# Patient Record
Sex: FEMALE | Race: WHITE | NOT HISPANIC OR LATINO
[De-identification: names, ages, dates, MRNs, and addresses within clinical notes are randomized per-mention and may not be internally consistent; named-entity substitution may affect disease eponyms.]

---

## 2020-11-25 ENCOUNTER — NON-APPOINTMENT (OUTPATIENT)
Age: 85
End: 2020-11-25

## 2020-11-25 DIAGNOSIS — Z87.891 PERSONAL HISTORY OF NICOTINE DEPENDENCE: ICD-10-CM

## 2020-11-25 DIAGNOSIS — I10 ESSENTIAL (PRIMARY) HYPERTENSION: ICD-10-CM

## 2020-11-25 DIAGNOSIS — Z80.8 FAMILY HISTORY OF MALIGNANT NEOPLASM OF OTHER ORGANS OR SYSTEMS: ICD-10-CM

## 2020-11-25 PROBLEM — Z00.00 ENCOUNTER FOR PREVENTIVE HEALTH EXAMINATION: Status: ACTIVE | Noted: 2020-11-25

## 2020-11-25 RX ORDER — CHROMIUM 200 MCG
TABLET ORAL
Refills: 0 | Status: ACTIVE | COMMUNITY

## 2020-11-25 RX ORDER — VIT C/E/ZN/COPPR/LUTEIN/ZEAXAN 250MG-90MG
CAPSULE ORAL
Refills: 0 | Status: ACTIVE | COMMUNITY

## 2020-11-25 RX ORDER — LOSARTAN POTASSIUM 25 MG/1
25 TABLET, FILM COATED ORAL DAILY
Refills: 0 | Status: ACTIVE | COMMUNITY

## 2021-02-16 ENCOUNTER — APPOINTMENT (OUTPATIENT)
Dept: PULMONOLOGY | Facility: CLINIC | Age: 86
End: 2021-02-16

## 2021-02-23 ENCOUNTER — APPOINTMENT (OUTPATIENT)
Dept: PULMONOLOGY | Facility: CLINIC | Age: 86
End: 2021-02-23
Payer: MEDICARE

## 2021-02-23 VITALS
OXYGEN SATURATION: 98 % | DIASTOLIC BLOOD PRESSURE: 68 MMHG | HEART RATE: 55 BPM | SYSTOLIC BLOOD PRESSURE: 118 MMHG | TEMPERATURE: 96.8 F

## 2021-02-23 PROCEDURE — 99214 OFFICE O/P EST MOD 30 MIN: CPT

## 2021-02-23 NOTE — HISTORY OF PRESENT ILLNESS
[TextBox_4] : the patient is 86 year F with SOB and has COPD and ILd She is doing well and lives a sedentary life style  She is on prednisone QOD and breo qd.  She has not had an interval event

## 2021-02-23 NOTE — ASSESSMENT
[FreeTextEntry1] : 1) stable pulmonary function  she is on Breo,. prednisone 10mg QOD and ipratropium nebs   3) continue medications

## 2021-02-23 NOTE — REASON FOR VISIT
[Follow-Up] : a follow-up visit [TextBox_44] : PT is a 85 y/o Female. [facility]PA Pulm Evaluation. PT states that she has a dry cough, difficulty swallowing from the 'drip of the oxygen from the nasal region'. PT experiences fatigue easily. PT denies any other complications.

## 2021-02-23 NOTE — PHYSICAL EXAM
[No Acute Distress] : no acute distress [Normal Appearance] : normal appearance [Normal Rate/Rhythm] : normal rate/rhythm [Normal S1, S2] : normal s1, s2 [No Resp Distress] : no resp distress [Clear to Auscultation Bilaterally] : clear to auscultation bilaterally [No Abnormalities] : no abnormalities [Benign] : benign [Normal Gait] : normal gait [No Clubbing] : no clubbing [No Cyanosis] : no cyanosis [No Edema] : no edema [FROM] : FROM [Normal Color/ Pigmentation] : normal color/ pigmentation [No Focal Deficits] : no focal deficits [Oriented x3] : oriented x3 [TextBox_2] : obese

## 2021-05-04 ENCOUNTER — APPOINTMENT (OUTPATIENT)
Dept: PULMONOLOGY | Facility: CLINIC | Age: 86
End: 2021-05-04
Payer: MEDICARE

## 2021-05-04 VITALS
TEMPERATURE: 97.2 F | OXYGEN SATURATION: 96 % | HEART RATE: 58 BPM | SYSTOLIC BLOOD PRESSURE: 146 MMHG | DIASTOLIC BLOOD PRESSURE: 72 MMHG

## 2021-05-04 PROCEDURE — 99213 OFFICE O/P EST LOW 20 MIN: CPT

## 2021-05-04 NOTE — REVIEW OF SYSTEMS
[Dyspnea] : dyspnea [SOB on Exertion] : sob on exertion [Arthralgias] : arthralgias [Myalgias] : myalgias [Back Pain] : back pain [Chronic Pain] : chronic pain [Negative] : Hematologic

## 2021-05-04 NOTE — ASSESSMENT
[FreeTextEntry1] : The patient has a long hx of smoking and has interstitial disease  She does not have any new sx  She has had a walk test for COVID and her SaO2 dropped  87% off O2 and returned to > 90% on 2l-3L  of O2

## 2021-05-04 NOTE — HISTORY OF PRESENT ILLNESS
[TextBox_4] : the patient is 86 year F with SOB and has COPD and ILd She is doing well and lives a sedentary life style  She is on prednisone QOD and breo qd.  She has not had an interval event   \par \par 5/4/21  The patient 86 yrs  she has had COVID vaccinations, that the volunteer firemen came to her house to give her    She offers no new complaint and seems her usual jovial state

## 2021-09-14 ENCOUNTER — APPOINTMENT (OUTPATIENT)
Dept: PULMONOLOGY | Facility: CLINIC | Age: 86
End: 2021-09-14
Payer: MEDICARE

## 2021-09-14 VITALS — HEART RATE: 58 BPM | SYSTOLIC BLOOD PRESSURE: 159 MMHG | DIASTOLIC BLOOD PRESSURE: 76 MMHG | OXYGEN SATURATION: 96 %

## 2021-09-14 PROCEDURE — 99214 OFFICE O/P EST MOD 30 MIN: CPT

## 2021-09-14 RX ORDER — ALBUTEROL SULFATE 90 UG/1
108 (90 BASE) INHALANT RESPIRATORY (INHALATION)
Qty: 2 | Refills: 3 | Status: ACTIVE | COMMUNITY

## 2021-09-14 NOTE — REASON FOR VISIT
[Follow-Up] : a follow-up visit [COPD] : COPD [TextBox_44] : Pt states that she rarely experiences SOB and wheeze.  Pt does have to clear her throat often due to oxygen use and GERD.

## 2021-09-14 NOTE — PHYSICAL EXAM
[No Acute Distress] : no acute distress [Well Nourished] : well nourished [Well Groomed] : well groomed [Normal Rate/Rhythm] : normal rate/rhythm [Normal S1, S2] : normal s1, s2 [No Resp Distress] : no resp distress [Clear to Auscultation Bilaterally] : clear to auscultation bilaterally [Benign] : benign [No Clubbing] : no clubbing [No Cyanosis] : no cyanosis [Normal Color/ Pigmentation] : normal color/ pigmentation [No Focal Deficits] : no focal deficits [Oriented x3] : oriented x3 [TextBox_2] : appears to have lost some weight   [TextBox_54] : systolic murmur [TextBox_68] : clear lung fields

## 2021-09-14 NOTE — ASSESSMENT
[FreeTextEntry1] : The patient has a long hx of smoking and has interstitial disease  She does not have any new sx  She has had a walk test for COVID and her SaO2 dropped  87% off O2 and returned to > 90% on 2l-3L  of O2   \par \par 9/14/21  I will try to arrange a new O2 concentrator for the patient   she is otherwise stable  no distress  scripts refilled

## 2021-09-14 NOTE — HISTORY OF PRESENT ILLNESS
[Former] : former [Never] : never [Continuous] : Continuous [NC] : Nasal Cannula [24 hrs] : 24 hours/day [TextBox_4] : 9/14/21 the patient is doing well she would like to try and use a Inogen O2 concentrator but is concerned it may not work for her. [TextBox_11] : 1 [TextBox_13] : 55 [YearQuit] : 2005 [FreeTextEntry1] : 2

## 2022-03-15 ENCOUNTER — APPOINTMENT (OUTPATIENT)
Dept: PULMONOLOGY | Facility: CLINIC | Age: 87
End: 2022-03-15
Payer: MEDICARE

## 2022-03-15 VITALS
OXYGEN SATURATION: 91 % | TEMPERATURE: 96.8 F | HEART RATE: 63 BPM | SYSTOLIC BLOOD PRESSURE: 109 MMHG | WEIGHT: 160 LBS | DIASTOLIC BLOOD PRESSURE: 60 MMHG | HEIGHT: 61.75 IN | BODY MASS INDEX: 29.44 KG/M2

## 2022-03-15 DIAGNOSIS — K21.9 GASTRO-ESOPHAGEAL REFLUX DISEASE W/OUT ESOPHAGITIS: ICD-10-CM

## 2022-03-15 PROCEDURE — 99214 OFFICE O/P EST MOD 30 MIN: CPT

## 2022-03-15 NOTE — REASON FOR VISIT
[Follow-Up] : a follow-up visit [Cough] : cough [COPD] : COPD [TextBox_44] : 6 months. pt states that her acid reflux and post nasal drip has worsened causing her to cough. Pt is O2 dependent.

## 2022-03-15 NOTE — ASSESSMENT
[FreeTextEntry1] : The patient has a long hx of smoking and has interstitial disease  She does not have any new sx  She has had a walk test for COVID and her SaO2 dropped  87% off O2 and returned to > 90% on 2l-3L  of O2   \par \par 9/14/21  I will try to arrange a new O2 concentrator for the patient   she is otherwise stable  no distress  scripts refilled\par \par 3/15/22 The patient has no new sx She has GERD that she treats with 'Pypya Enzymes" holistic medication that seems to be controlling for the patient    ILD slow progression probably related to her arthritis   continue supportive RX    30minutes F/u 6mos

## 2022-03-15 NOTE — REVIEW OF SYSTEMS
[Dyspnea] : dyspnea [SOB on Exertion] : sob on exertion [GERD] : gerd [Arthralgias] : arthralgias [Back Pain] : back pain [Myalgias] : myalgias [Chronic Pain] : chronic pain [Negative] : Hematologic

## 2022-03-15 NOTE — HISTORY OF PRESENT ILLNESS
[Former] : former [Continuous] : Continuous [NC] : Nasal Cannula [24 hrs] : 24 hours/day [Never] : never [TextBox_4] : 9/14/21 the patient is doing well she would like to try and use a Inogen O2 concentrator but is concerned it may not work for her.\par \par 3/15/22 The patient is doing well and had no new pulmonary complaint She has pulmonary fibrosis  but has been stable  She is vaccinated  She has been isolated at home b/o the COVID  She has no interval medical events  She has arthritis which is probably related to the fibrosis  She has acid reflux    [TextBox_11] : 1 [TextBox_13] : 55 [YearQuit] : 2005 [FreeTextEntry1] : 2

## 2022-06-01 ENCOUNTER — NON-APPOINTMENT (OUTPATIENT)
Age: 87
End: 2022-06-01

## 2022-06-08 ENCOUNTER — APPOINTMENT (OUTPATIENT)
Dept: PULMONOLOGY | Facility: CLINIC | Age: 87
End: 2022-06-08
Payer: MEDICARE

## 2022-06-08 PROCEDURE — 99441: CPT | Mod: 95

## 2022-09-13 ENCOUNTER — APPOINTMENT (OUTPATIENT)
Dept: PULMONOLOGY | Facility: CLINIC | Age: 87
End: 2022-09-13

## 2022-09-27 ENCOUNTER — APPOINTMENT (OUTPATIENT)
Dept: PULMONOLOGY | Facility: CLINIC | Age: 87
End: 2022-09-27

## 2022-10-25 ENCOUNTER — APPOINTMENT (OUTPATIENT)
Dept: PULMONOLOGY | Facility: CLINIC | Age: 87
End: 2022-10-25

## 2022-10-25 VITALS
OXYGEN SATURATION: 99 % | WEIGHT: 132.8 LBS | DIASTOLIC BLOOD PRESSURE: 58 MMHG | BODY MASS INDEX: 25.07 KG/M2 | HEIGHT: 61 IN | HEART RATE: 69 BPM | SYSTOLIC BLOOD PRESSURE: 120 MMHG | TEMPERATURE: 96.5 F

## 2022-10-25 DIAGNOSIS — Z23 ENCOUNTER FOR IMMUNIZATION: ICD-10-CM

## 2022-10-25 PROCEDURE — 99213 OFFICE O/P EST LOW 20 MIN: CPT

## 2022-10-25 RX ORDER — BRIMONIDINE TARTRATE 1 MG/ML
0.1 SOLUTION/ DROPS OPHTHALMIC
Refills: 0 | Status: ACTIVE | COMMUNITY
Start: 2022-10-25

## 2022-10-25 RX ORDER — FLUTICASONE FUROATE AND VILANTEROL TRIFENATATE 200; 25 UG/1; UG/1
200-25 POWDER RESPIRATORY (INHALATION)
Qty: 3 | Refills: 3 | Status: COMPLETED | COMMUNITY
End: 2022-10-25

## 2022-10-25 RX ORDER — IPRATROPIUM BROMIDE AND ALBUTEROL SULFATE 2.5; .5 MG/3ML; MG/3ML
0.5-2.5 (3) SOLUTION RESPIRATORY (INHALATION)
Qty: 1080 | Refills: 3 | Status: COMPLETED | COMMUNITY
End: 2022-10-25

## 2022-10-25 NOTE — HISTORY OF PRESENT ILLNESS
[Former] : former [Never] : never [Continuous] : Continuous [NC] : Nasal Cannula [24 hrs] : 24 hours/day [TextBox_4] : 9/14/21 the patient is doing well she would like to try and use a Inogen O2 concentrator but is concerned it may not work for her.\par \par 3/15/22 The patient is doing well and had no new pulmonary complaint She has pulmonary fibrosis  but has been stable  She is vaccinated  She has been isolated at home b/o the COVID  She has no interval medical events  She has arthritis which is probably related to the fibrosis  She has acid reflux   \par \par 10/25/22 The patient is doing well and has lost 30lbs.  The patient has lost some of her appetite and also has a problem with her tongue   She had a family reunion with her elederly brothers( 3) and other relatives in [place] this SEPT.   She has no new resp sx      She has stopped the BREO and does not use the nebulizer  She does take the prednisone every other day  [TextBox_11] : 1 [TextBox_13] : 55 [YearQuit] : 2005 [FreeTextEntry1] : 2

## 2022-10-25 NOTE — REASON FOR VISIT
[Follow-Up] : a follow-up visit [COPD] : COPD [Shortness of Breath] : shortness of breath [TextBox_44] : 6 month follow up.

## 2022-10-25 NOTE — REVIEW OF SYSTEMS
[Dyspnea] : dyspnea [SOB on Exertion] : sob on exertion [GERD] : gerd [Arthralgias] : arthralgias [Myalgias] : myalgias [Back Pain] : back pain [Chronic Pain] : chronic pain [Negative] : Hematologic [TextBox_3] : lost 30lbs

## 2022-10-25 NOTE — ASSESSMENT
[FreeTextEntry1] : The patient has a long hx of smoking and has interstitial disease  She does not have any new sx  She has had a walk test for COVID and her SaO2 dropped  87% off O2 and returned to > 90% on 2l-3L  of O2   \par \par 9/14/21  I will try to arrange a new O2 concentrator for the patient   she is otherwise stable  no distress  scripts refilled\par \par 3/15/22 The patient has no new sx She has GERD that she treats with 'Pypya Enzymes" holistic medication that seems to be controlling for the patient    ILD slow progression probably related to her arthritis   continue supportive RX    30minutes F/u 6mos   \par \par 10/25/22 The patient is doing well and the weight loss has made hr mobility better but is still limited by musculoskeletal limitations She will have an walk test for O2 desaturation Her Sx are less COPD and more ILd desaturation is worse and she neds  3-4L for SaO2 > 88%  f/u 6mos  time spent 20 min counseling, education, documentation,  medication reviewed,  old records reviewed, HX and PE \par \par \par \par \par

## 2023-03-13 ENCOUNTER — NON-APPOINTMENT (OUTPATIENT)
Age: 88
End: 2023-03-13

## 2023-03-21 ENCOUNTER — APPOINTMENT (OUTPATIENT)
Dept: PULMONOLOGY | Facility: CLINIC | Age: 88
End: 2023-03-21
Payer: MEDICARE

## 2023-03-21 VITALS
DIASTOLIC BLOOD PRESSURE: 62 MMHG | OXYGEN SATURATION: 99 % | HEART RATE: 57 BPM | HEIGHT: 61 IN | BODY MASS INDEX: 23.98 KG/M2 | WEIGHT: 127 LBS | TEMPERATURE: 97.4 F | SYSTOLIC BLOOD PRESSURE: 140 MMHG

## 2023-03-21 PROCEDURE — 99214 OFFICE O/P EST MOD 30 MIN: CPT

## 2023-03-21 RX ORDER — PNV NO.95/FERROUS FUM/FOLIC AC 28MG-0.8MG
TABLET ORAL
Refills: 0 | Status: ACTIVE | COMMUNITY

## 2023-04-04 NOTE — REASON FOR VISIT
[Follow-Up] : a follow-up visit [COPD] : COPD [Shortness of Breath] : shortness of breath [Wheezing] : wheezing [TextBox_44] : 5 months. Pt states she has SOB sometimes with activity and sometimes wheezing. Pt also states she has post nasal drip. Patient states when she was in hospital they turned her LPM up to 3 and she felt better. She would like to know when she will be tested to see if she needs more oxygen.

## 2023-04-04 NOTE — HISTORY OF PRESENT ILLNESS
[Former] : former [Continuous] : Continuous [NC] : Nasal Cannula [24 hrs] : 24 hours/day [TextBox_4] : 3/21/23  The patient is s/p hip surgery THR  and had post op COVID She fels she acquired it in the pre op waiting room She also had a 35 lbs weight loss recovering from COvID    She has generally has more flexibility and generally weak.     [TextBox_11] : 1 [TextBox_13] : 55 [YearQuit] : 2005 [FreeTextEntry1] : 2

## 2023-04-04 NOTE — ASSESSMENT
[FreeTextEntry1] : The patient has a long hx of smoking and has interstitial disease  She does not have any new sx  She has had a walk test for COVID and her SaO2 dropped  87% off O2 and returned to > 90% on 2l-3L  of O2   \par \par 9/14/21  I will try to arrange a new O2 concentrator for the patient   she is otherwise stable  no distress  scripts refilled\par \par 3/15/22 The patient has no new sx She has GERD that she treats with 'Pypya Enzymes" holistic medication that seems to be controlling for the patient    ILD slow progression probably related to her arthritis   continue supportive RX    30minutes F/u 6mos   \par \par 10/25/22 The patient is doing well and the weight loss has made hr mobility better but is still limited by musculoskeletal limitations She will have an walk test for O2 desaturation Her Sx are less COPD and more ILd desaturation is worse and she neds  3-4L for SaO2 > 88%  f/u 6mos  time spent 20 min counseling, education, documentation,  medication reviewed,  old records reviewed, HX and PE \par \par 3/21/23  The patient is doing well She is a former smoker and has interstitial lung disease  \par She has remarkably rebounded from the COVID and THR     She is controlled and  and has not  had any new resp events   She needs touse the O2 nocturnally and daytime when dyspneic and O2 sat < 88% home nurse will be notified  [phone]   time spent 30mn  counseling, education, documentation, imaging reviewed, medication reviewed,  old records reviewed, HX and PE \par \par \par \par

## 2023-05-05 ENCOUNTER — NON-APPOINTMENT (OUTPATIENT)
Age: 88
End: 2023-05-05

## 2023-06-26 ENCOUNTER — RX ONLY (RX ONLY)
Age: 88
End: 2023-06-26

## 2023-07-05 RX ORDER — FLUTICASONE FUROATE AND VILANTEROL TRIFENATATE 100; 25 UG/1; UG/1
100-25 POWDER RESPIRATORY (INHALATION)
Qty: 90 | Refills: 3 | Status: ACTIVE | COMMUNITY
Start: 2023-07-05 | End: 1900-01-01

## 2023-07-10 ENCOUNTER — APPOINTMENT (OUTPATIENT)
Dept: PULMONOLOGY | Facility: CLINIC | Age: 88
End: 2023-07-10

## 2023-07-20 ENCOUNTER — OFFICE (OUTPATIENT)
Facility: LOCATION | Age: 88
Setting detail: OPHTHALMOLOGY
End: 2023-07-20
Payer: MEDICARE

## 2023-07-20 DIAGNOSIS — H16.223: ICD-10-CM

## 2023-07-20 DIAGNOSIS — H26.40: ICD-10-CM

## 2023-07-20 DIAGNOSIS — H35.033: ICD-10-CM

## 2023-07-20 DIAGNOSIS — H40.1131: ICD-10-CM

## 2023-07-20 DIAGNOSIS — H35.3132: ICD-10-CM

## 2023-07-20 DIAGNOSIS — Z96.1: ICD-10-CM

## 2023-07-20 PROBLEM — H35.443 AGE-RELATED RETICULAR DEGENERATION OF RETINA; BOTH EYES: Status: ACTIVE | Noted: 2023-07-20

## 2023-07-20 PROCEDURE — 99213 OFFICE O/P EST LOW 20 MIN: CPT | Performed by: OPHTHALMOLOGY

## 2023-07-20 PROCEDURE — 92250 FUNDUS PHOTOGRAPHY W/I&R: CPT | Performed by: OPHTHALMOLOGY

## 2023-07-20 PROCEDURE — 92283 EXTND COLOR VISION XM: CPT | Performed by: OPHTHALMOLOGY

## 2023-07-20 PROCEDURE — 92083 EXTENDED VISUAL FIELD XM: CPT | Performed by: OPHTHALMOLOGY

## 2023-07-20 ASSESSMENT — CONFRONTATIONAL VISUAL FIELD TEST (CVF)
OS_FINDINGS: FULL
OD_FINDINGS: FULL

## 2023-07-20 ASSESSMENT — TONOMETRY
OD_IOP_MMHG: 16
OS_IOP_MMHG: 16

## 2023-07-20 ASSESSMENT — SUPERFICIAL PUNCTATE KERATITIS (SPK)
OD_SPK: T
OS_SPK: T

## 2023-07-21 ASSESSMENT — REFRACTION_CURRENTRX
OD_OVR_VA: 20/
OS_OVR_VA: 20/
OS_CYLINDER: +2.25
OS_SPHERE: -0.25
OD_SPHERE: -1.25
OD_ADD: +2.50
OD_AXIS: 3
OD_CYLINDER: +1.50
OS_AXIS: 171
OS_ADD: +2.50

## 2023-07-21 ASSESSMENT — VISUAL ACUITY
OD_BCVA: 20/30
OS_BCVA: 20/40

## 2023-10-17 ENCOUNTER — APPOINTMENT (OUTPATIENT)
Dept: PULMONOLOGY | Facility: CLINIC | Age: 88
End: 2023-10-17
Payer: MEDICARE

## 2023-10-17 VITALS
OXYGEN SATURATION: 96 % | HEART RATE: 65 BPM | SYSTOLIC BLOOD PRESSURE: 112 MMHG | BODY MASS INDEX: 23.22 KG/M2 | TEMPERATURE: 97.6 F | DIASTOLIC BLOOD PRESSURE: 68 MMHG | WEIGHT: 123 LBS | HEIGHT: 61 IN

## 2023-10-17 DIAGNOSIS — Z86.79 PERSONAL HISTORY OF OTHER DISEASES OF THE CIRCULATORY SYSTEM: ICD-10-CM

## 2023-10-17 DIAGNOSIS — M19.90 UNSPECIFIED OSTEOARTHRITIS, UNSPECIFIED SITE: ICD-10-CM

## 2023-10-17 PROCEDURE — 99214 OFFICE O/P EST MOD 30 MIN: CPT

## 2023-10-17 RX ORDER — ATENOLOL 50 MG/1
50 TABLET ORAL
Refills: 0 | Status: DISCONTINUED | COMMUNITY
End: 2023-10-17

## 2023-10-17 RX ORDER — BRIMONIDINE TARTRATE 1 MG/ML
0.1 SOLUTION/ DROPS OPHTHALMIC
Qty: 10 | Refills: 0 | Status: DISCONTINUED | COMMUNITY
Start: 2022-10-21 | End: 2023-10-17

## 2023-10-17 RX ORDER — FLUTICASONE PROPIONATE 50 UG/1
50 SPRAY, METERED NASAL
Refills: 0 | Status: DISCONTINUED | COMMUNITY
End: 2023-10-17

## 2023-10-17 RX ORDER — ALBUTEROL SULFATE 2.5 MG/.5ML
SOLUTION RESPIRATORY (INHALATION)
Refills: 0 | Status: DISCONTINUED | COMMUNITY

## 2023-11-13 ENCOUNTER — APPOINTMENT (OUTPATIENT)
Dept: PULMONOLOGY | Facility: CLINIC | Age: 88
End: 2023-11-13
Payer: MEDICARE

## 2023-11-13 PROCEDURE — 94727 GAS DIL/WSHOT DETER LNG VOL: CPT

## 2023-11-13 PROCEDURE — 94729 DIFFUSING CAPACITY: CPT

## 2023-11-13 PROCEDURE — 94010 BREATHING CAPACITY TEST: CPT

## 2024-01-03 ENCOUNTER — OFFICE (OUTPATIENT)
Facility: LOCATION | Age: 89
Setting detail: OPHTHALMOLOGY
End: 2024-01-03
Payer: MEDICARE

## 2024-01-03 DIAGNOSIS — Z96.1: ICD-10-CM

## 2024-01-03 DIAGNOSIS — H35.3132: ICD-10-CM

## 2024-01-03 DIAGNOSIS — H40.1131: ICD-10-CM

## 2024-01-03 DIAGNOSIS — H16.223: ICD-10-CM

## 2024-01-03 DIAGNOSIS — H35.033: ICD-10-CM

## 2024-01-03 DIAGNOSIS — H26.40: ICD-10-CM

## 2024-01-03 PROCEDURE — 92014 COMPRE OPH EXAM EST PT 1/>: CPT | Performed by: OPHTHALMOLOGY

## 2024-01-03 PROCEDURE — 92133 CPTRZD OPH DX IMG PST SGM ON: CPT | Performed by: OPHTHALMOLOGY

## 2024-01-03 ASSESSMENT — SUPERFICIAL PUNCTATE KERATITIS (SPK)
OD_SPK: T
OS_SPK: T

## 2024-01-03 ASSESSMENT — CONFRONTATIONAL VISUAL FIELD TEST (CVF)
OD_FINDINGS: FULL
OS_FINDINGS: FULL

## 2024-01-04 ASSESSMENT — REFRACTION_CURRENTRX
OD_OVR_VA: 20/
OS_ADD: +2.50
OS_SPHERE: -0.50
OD_SPHERE: -1.50
OD_AXIS: 010
OS_AXIS: 176
OD_CYLINDER: +1.50
OS_OVR_VA: 20/
OD_ADD: +2.50
OS_CYLINDER: +2.50

## 2024-01-04 ASSESSMENT — REFRACTION_AUTOREFRACTION
OD_CYLINDER: +2.75
OS_CYLINDER: +4.50
OD_SPHERE: -1.75
OS_SPHERE: -1.50
OD_AXIS: 010
OS_AXIS: 172

## 2024-01-04 ASSESSMENT — SPHEQUIV_DERIVED
OS_SPHEQUIV: 0.75
OD_SPHEQUIV: -0.375

## 2024-02-14 ENCOUNTER — APPOINTMENT (OUTPATIENT)
Dept: PULMONOLOGY | Facility: CLINIC | Age: 89
End: 2024-02-14

## 2024-04-29 RX ORDER — PREDNISONE 10 MG/1
10 TABLET ORAL
Qty: 45 | Refills: 3 | Status: ACTIVE | COMMUNITY
Start: 1900-01-01 | End: 1900-01-01

## 2024-05-06 ENCOUNTER — OFFICE (OUTPATIENT)
Facility: LOCATION | Age: 89
Setting detail: OPHTHALMOLOGY
End: 2024-05-06
Payer: MEDICARE

## 2024-05-06 DIAGNOSIS — H35.033: ICD-10-CM

## 2024-05-06 DIAGNOSIS — H40.1131: ICD-10-CM

## 2024-05-06 DIAGNOSIS — H16.223: ICD-10-CM

## 2024-05-06 DIAGNOSIS — H35.3132: ICD-10-CM

## 2024-05-06 DIAGNOSIS — H26.40: ICD-10-CM

## 2024-05-06 DIAGNOSIS — Z96.1: ICD-10-CM

## 2024-05-06 PROCEDURE — 99213 OFFICE O/P EST LOW 20 MIN: CPT | Performed by: OPHTHALMOLOGY

## 2024-05-06 PROCEDURE — 92134 CPTRZ OPH DX IMG PST SGM RTA: CPT | Performed by: OPHTHALMOLOGY

## 2024-05-06 ASSESSMENT — CONFRONTATIONAL VISUAL FIELD TEST (CVF)
OD_FINDINGS: FULL
OS_FINDINGS: FULL

## 2024-05-08 ENCOUNTER — APPOINTMENT (OUTPATIENT)
Dept: PULMONOLOGY | Facility: CLINIC | Age: 89
End: 2024-05-08
Payer: MEDICARE

## 2024-05-08 VITALS
TEMPERATURE: 98.6 F | HEIGHT: 61 IN | OXYGEN SATURATION: 96 % | SYSTOLIC BLOOD PRESSURE: 114 MMHG | DIASTOLIC BLOOD PRESSURE: 70 MMHG | WEIGHT: 123 LBS | HEART RATE: 71 BPM | BODY MASS INDEX: 23.22 KG/M2

## 2024-05-08 DIAGNOSIS — J44.9 CHRONIC OBSTRUCTIVE PULMONARY DISEASE, UNSPECIFIED: ICD-10-CM

## 2024-05-08 DIAGNOSIS — J84.111 IDIOPATHIC INTERSTITIAL PNEUMONIA, NOT OTHERWISE SPECIFIED: ICD-10-CM

## 2024-05-08 PROCEDURE — 99214 OFFICE O/P EST MOD 30 MIN: CPT

## 2024-05-08 NOTE — PHYSICAL EXAM
[No Acute Distress] : no acute distress [Well Nourished] : well nourished [Well Groomed] : well groomed [Normal Rate/Rhythm] : normal rate/rhythm [Normal S1, S2] : normal s1, s2 [No Resp Distress] : no resp distress [Clear to Auscultation Bilaterally] : clear to auscultation bilaterally [Benign] : benign [No Clubbing] : no clubbing [No Cyanosis] : no cyanosis [Normal Color/ Pigmentation] : normal color/ pigmentation [No Focal Deficits] : no focal deficits [Oriented x3] : oriented x3 [TextBox_2] : appears to have lost some weight she is lost 30 pounds and thinks she might of gained 5 back [TextBox_54] : systolic murmur [TextBox_68] : clear lung fields  decreased BS

## 2024-05-08 NOTE — HISTORY OF PRESENT ILLNESS
[Former] : former [Never] : never [TextBox_4] :     10/17/23 The patient is doing well She was in the hospital in may for a bradycardia at [facility] she does not report any new pulmonary events. She has lost weight. She has to maciej back to f/u with the cardiologists the patient has no pulmonary complaints at this time. The patient walks with a walker and is short of breath with exertion. Patient has COPD and has a 55-pack-year history of smoking.   Smoking Status: former   # Packs per day: 1   # Years: 55   Year quit: 2005 5/8/24  She was last in the hospital a year ago  She has been doing well without O2  The has not been using her O2 and has been monitoring her SaO2 with an Oximeter and does not exert herself if she gete SoB and then her O2 does not drop below 89%  She has some discomfort at the bottom of her anterior left chest  and increases with movement     She has no cough or sputum     [TextBox_11] : 1 [TextBox_13] : 55 [YearQuit] : 2005

## 2024-05-08 NOTE — ASSESSMENT
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) The patient has a long hx of smoking and has interstitial disease She does not have any new sx She has had a walk test for COVID and her SaO2 dropped 87% off O2 and returned to > 90% on 2l-3L of O2  9/14/21 I will try to arrange a new O2 concentrator for the patient she is otherwise stable no distress scripts refilled  3/15/22 The patient has no new sx She has GERD that she treats with 'Pypya Enzymes" holistic medication that seems to be controlling for the patient ILD slow progression probably related to her arthritis continue supportive RX 30minutes F/u 6mos  0/25/22 The patient is doing well and the weight loss has made hr mobility better but is still limited by musculoskeletal limitations She will have an walk test for O2 desaturation Her Sx are less COPD and more ILd desaturation is worse and she neds 3-4L for SaO2 > 88% f/u 6mos time spent 20 min counseling, education, documentation, medication reviewed, old records reviewed, HX and PE  3/21/23 The patient is doing well She is a former smoker and has interstitial lung disease  She has remarkably rebounded from the COVID and THR She is controlled and and has not had any new resp events She needs touse the O2 nocturnally and daytime when dyspneic and O2 sat < 88% home nurse will be notified [phone] time spent 30mn counseling, education, documentation, imaging reviewed, medication reviewed, old records reviewed, HX and PE  10/17/23  COPD   55pk yrs of smoking    on prednisone 10mg  QOD and BREO    continue the same   pulmonary rehab the patient has been monitoring her SaO2 and finds in most activities that she does which require minimum of exertion have not lowered her SaO2 below 90.  The patient does not complain of wheeze cough or sputum production and her chest is clear on auscultation.  Patient's medications are up-to-date and refills are due next year patient will follow-up in approximately 4 months.  Time spent 30 minutes counseling, education, documentation, imaging reviewed, medication reviewed, old records reviewed, HX and PE   5/8/24  The patient is going to be 89 years old 7[birth year]11 and is doing well  that correlates to her weight loss and the Breo  will f/u in 6 months She was encouraged to use her O2 while sleeping   and monitor her use with an oximeter during the daytime time spent 30 min counseling, education, documentation, imaging reviewed, medication reviewed, inhaler demonstrated, old records reviewed, HX and PE

## 2024-05-08 NOTE — REASON FOR VISIT
[Follow-Up] : a follow-up visit [Cough] : cough [COPD] : COPD [Shortness of Breath] : shortness of breath [Wheezing] : wheezing [TextEntry] : 7 month. Patient complaint of coughing, sob and wheezing. Patient need a medication refill. Patient informed MA that they took her oxygen away.

## 2024-09-23 ENCOUNTER — APPOINTMENT (OUTPATIENT)
Dept: PULMONOLOGY | Facility: CLINIC | Age: 89
End: 2024-09-23
Payer: MEDICARE

## 2024-09-23 VITALS
SYSTOLIC BLOOD PRESSURE: 118 MMHG | WEIGHT: 122 LBS | HEIGHT: 61 IN | DIASTOLIC BLOOD PRESSURE: 64 MMHG | HEART RATE: 72 BPM | BODY MASS INDEX: 23.03 KG/M2 | OXYGEN SATURATION: 96 % | TEMPERATURE: 98.2 F

## 2024-09-23 PROCEDURE — 99214 OFFICE O/P EST MOD 30 MIN: CPT

## 2024-09-23 NOTE — HISTORY OF PRESENT ILLNESS
[Former] : former [Never] : never [TextBox_11] : 1 [TextBox_4] : 5/8/24 She was last in the hospital a year ago She has been doing well without O2 The has not been using her O2 and has been monitoring her SaO2 with an Oximeter and does not exert herself if she gete SoB and then her O2 does not drop below 89% She has some discomfort at the bottom of her anterior left chest and increases with movement She has no cough or sputum.   Smoking Status: former   # Packs per day: 1   # Years: 55    9/23/24  The patient is doing well  within her limited capacity  The patient is not having any distress She does not have to use O2   [TextBox_13] : 55 [YearQuit] : 2005

## 2024-09-23 NOTE — REASON FOR VISIT
[Follow-Up] : a follow-up visit [COPD] : COPD [Shortness of Breath] : shortness of breath [TextEntry] : Pt here for 4 month follow up- stated she barely uses her inhalers anymore because she feels like she doesn't need it. Occasionally gets SOB and feels like she needs to take a deep breath in to get that air back. Occasionally gets wheezing (states it has to do with her post nasal drip, and when she's tired) No other breathing complaints currently. -NK.

## 2024-10-21 ENCOUNTER — OFFICE (OUTPATIENT)
Facility: LOCATION | Age: 89
Setting detail: OPHTHALMOLOGY
End: 2024-10-21
Payer: MEDICARE

## 2024-10-21 DIAGNOSIS — H35.033: ICD-10-CM

## 2024-10-21 DIAGNOSIS — H26.40: ICD-10-CM

## 2024-10-21 DIAGNOSIS — H40.1131: ICD-10-CM

## 2024-10-21 DIAGNOSIS — H16.223: ICD-10-CM

## 2024-10-21 DIAGNOSIS — H35.3132: ICD-10-CM

## 2024-10-21 PROCEDURE — 92283 EXTND COLOR VISION XM: CPT | Performed by: OPHTHALMOLOGY

## 2024-10-21 PROCEDURE — 92250 FUNDUS PHOTOGRAPHY W/I&R: CPT | Performed by: OPHTHALMOLOGY

## 2024-10-21 PROCEDURE — 92083 EXTENDED VISUAL FIELD XM: CPT | Performed by: OPHTHALMOLOGY

## 2024-10-21 PROCEDURE — 92014 COMPRE OPH EXAM EST PT 1/>: CPT | Performed by: OPHTHALMOLOGY

## 2024-10-21 ASSESSMENT — CONFRONTATIONAL VISUAL FIELD TEST (CVF)
OD_FINDINGS: FULL
OS_FINDINGS: FULL

## 2024-10-21 ASSESSMENT — SUPERFICIAL PUNCTATE KERATITIS (SPK)
OS_SPK: T
OD_SPK: T

## 2024-10-21 ASSESSMENT — TONOMETRY
OD_IOP_MMHG: 20
OS_IOP_MMHG: 20

## 2024-10-22 ASSESSMENT — KERATOMETRY
OS_K2POWER_DIOPTERS: 46.75
OS_AXISANGLE_DEGREES: 175
OS_K1POWER_DIOPTERS: 43.25

## 2024-10-22 ASSESSMENT — REFRACTION_CURRENTRX
OS_SPHERE: -0.25
OD_AXIS: 008
OS_CYLINDER: +2.75
OD_ADD: +2.50
OD_CYLINDER: +1.25
OD_OVR_VA: 20/
OD_SPHERE: -1.25
OS_ADD: +2.50
OS_OVR_VA: 20/
OS_AXIS: 175

## 2024-10-22 ASSESSMENT — VISUAL ACUITY
OS_BCVA: 20/50-2
OD_BCVA: 20/60-2

## 2024-10-30 ENCOUNTER — OFFICE (OUTPATIENT)
Facility: LOCATION | Age: 89
Setting detail: OPHTHALMOLOGY
End: 2024-10-30
Payer: MEDICARE

## 2024-10-30 DIAGNOSIS — H26.40: ICD-10-CM

## 2024-10-30 PROCEDURE — 66821 AFTER CATARACT LASER SURGERY: CPT | Mod: RT | Performed by: OPHTHALMOLOGY

## 2024-10-30 ASSESSMENT — TONOMETRY: OD_IOP_MMHG: 17

## 2024-10-30 ASSESSMENT — SUPERFICIAL PUNCTATE KERATITIS (SPK)
OS_SPK: T
OD_SPK: T

## 2024-11-01 ENCOUNTER — RX ONLY (RX ONLY)
Age: 89
End: 2024-11-01

## 2024-11-01 ASSESSMENT — VISUAL ACUITY
OS_BCVA: 20/70
OD_BCVA: 20/60-1

## 2024-11-01 ASSESSMENT — REFRACTION_CURRENTRX
OS_AXIS: 175
OD_OVR_VA: 20/
OS_SPHERE: -0.25
OS_OVR_VA: 20/
OS_ADD: +2.50
OS_CYLINDER: +2.75
OD_SPHERE: -1.25
OD_CYLINDER: +1.25
OD_AXIS: 008
OD_ADD: +2.50

## 2024-11-01 ASSESSMENT — KERATOMETRY
OS_K1POWER_DIOPTERS: 43.25
OS_AXISANGLE_DEGREES: 175
OS_K2POWER_DIOPTERS: 46.75

## 2024-11-06 ENCOUNTER — OFFICE (OUTPATIENT)
Facility: LOCATION | Age: 89
Setting detail: OPHTHALMOLOGY
End: 2024-11-06
Payer: MEDICARE

## 2024-11-06 DIAGNOSIS — H26.40: ICD-10-CM

## 2024-11-06 PROCEDURE — 66821 AFTER CATARACT LASER SURGERY: CPT | Mod: 24,LT | Performed by: OPHTHALMOLOGY

## 2024-11-06 ASSESSMENT — TONOMETRY: OS_IOP_MMHG: 17

## 2024-11-06 ASSESSMENT — SUPERFICIAL PUNCTATE KERATITIS (SPK)
OD_SPK: T
OS_SPK: T

## 2024-11-07 ENCOUNTER — RX ONLY (RX ONLY)
Age: 89
End: 2024-11-07

## 2024-11-07 ASSESSMENT — REFRACTION_CURRENTRX
OS_ADD: +2.50
OD_AXIS: 008
OD_CYLINDER: +1.25
OS_AXIS: 175
OD_ADD: +2.50
OS_SPHERE: -0.25
OD_SPHERE: -1.25
OD_OVR_VA: 20/
OS_OVR_VA: 20/
OS_CYLINDER: +2.75

## 2024-11-07 ASSESSMENT — KERATOMETRY
OS_K1POWER_DIOPTERS: 43.25
OS_K2POWER_DIOPTERS: 46.75
OS_AXISANGLE_DEGREES: 175

## 2024-11-07 ASSESSMENT — VISUAL ACUITY
OD_BCVA: 20/40
OS_BCVA: 20/30-1

## 2024-11-18 ENCOUNTER — OFFICE (OUTPATIENT)
Facility: LOCATION | Age: 89
Setting detail: OPHTHALMOLOGY
End: 2024-11-18
Payer: MEDICARE

## 2024-11-18 DIAGNOSIS — H26.40: ICD-10-CM

## 2024-11-18 PROCEDURE — 99024 POSTOP FOLLOW-UP VISIT: CPT | Performed by: OPHTHALMOLOGY

## 2024-11-18 ASSESSMENT — SUPERFICIAL PUNCTATE KERATITIS (SPK)
OS_SPK: T
OD_SPK: T

## 2024-11-18 ASSESSMENT — TONOMETRY
OD_IOP_MMHG: 15
OS_IOP_MMHG: 18

## 2024-11-20 ASSESSMENT — REFRACTION_CURRENTRX
OS_CYLINDER: +2.75
OS_SPHERE: -0.25
OD_AXIS: 008
OD_CYLINDER: +1.25
OD_OVR_VA: 20/
OS_AXIS: 175
OS_ADD: +2.50
OS_OVR_VA: 20/
OD_ADD: +2.50
OD_SPHERE: -1.25

## 2024-11-20 ASSESSMENT — KERATOMETRY
OS_K2POWER_DIOPTERS: 46.75
OS_K1POWER_DIOPTERS: 43.25
OS_AXISANGLE_DEGREES: 175

## 2024-11-20 ASSESSMENT — VISUAL ACUITY
OS_BCVA: 20/30-2
OD_BCVA: 20/40

## 2025-01-02 ENCOUNTER — NON-APPOINTMENT (OUTPATIENT)
Age: 89
End: 2025-01-02

## 2025-01-06 ENCOUNTER — RX RENEWAL (OUTPATIENT)
Age: 89
End: 2025-01-06

## 2025-01-20 ENCOUNTER — NON-APPOINTMENT (OUTPATIENT)
Age: 89
End: 2025-01-20

## 2025-03-05 ENCOUNTER — OFFICE (OUTPATIENT)
Facility: LOCATION | Age: OVER 89
Setting detail: OPHTHALMOLOGY
End: 2025-03-05
Payer: MEDICARE

## 2025-03-05 DIAGNOSIS — H40.1131: ICD-10-CM

## 2025-03-05 DIAGNOSIS — H35.3132: ICD-10-CM

## 2025-03-05 DIAGNOSIS — H16.223: ICD-10-CM

## 2025-03-05 DIAGNOSIS — H35.033: ICD-10-CM

## 2025-03-05 DIAGNOSIS — H26.40: ICD-10-CM

## 2025-03-05 PROCEDURE — 92014 COMPRE OPH EXAM EST PT 1/>: CPT | Performed by: OPHTHALMOLOGY

## 2025-03-05 PROCEDURE — 92133 CPTRZD OPH DX IMG PST SGM ON: CPT | Performed by: OPHTHALMOLOGY

## 2025-03-05 ASSESSMENT — SUPERFICIAL PUNCTATE KERATITIS (SPK)
OD_SPK: T
OS_SPK: T

## 2025-03-05 ASSESSMENT — TONOMETRY
OS_IOP_MMHG: 18
OD_IOP_MMHG: 18

## 2025-03-05 ASSESSMENT — CONFRONTATIONAL VISUAL FIELD TEST (CVF)
OD_FINDINGS: FULL
OS_FINDINGS: FULL

## 2025-03-06 ASSESSMENT — REFRACTION_CURRENTRX
OS_VPRISM_DIRECTION: PROGS
OD_CYLINDER: +1.50
OD_SPHERE: -1.25
OD_AXIS: 005
OS_ADD: +2.50
OS_AXIS: 169
OD_ADD: +2.50
OS_OVR_VA: 20/
OD_VPRISM_DIRECTION: PROGS
OD_OVR_VA: 20/
OS_CYLINDER: +2.75
OS_SPHERE: -0.50

## 2025-03-06 ASSESSMENT — REFRACTION_AUTOREFRACTION
OD_CYLINDER: +3.00
OS_AXIS: 177
OD_AXIS: 007
OS_CYLINDER: +4.50
OD_SPHERE: -2.50
OS_SPHERE: -1.50

## 2025-03-06 ASSESSMENT — KERATOMETRY
OS_K2POWER_DIOPTERS: 46.75
OS_K1POWER_DIOPTERS: 43.25
OS_AXISANGLE_DEGREES: 175

## 2025-03-06 ASSESSMENT — VISUAL ACUITY
OD_BCVA: 20/40+2
OS_BCVA: 20/25

## 2025-03-25 ENCOUNTER — APPOINTMENT (OUTPATIENT)
Dept: PULMONOLOGY | Facility: CLINIC | Age: 89
End: 2025-03-25
Payer: MEDICARE

## 2025-03-25 VITALS
SYSTOLIC BLOOD PRESSURE: 118 MMHG | TEMPERATURE: 97.2 F | WEIGHT: 131 LBS | BODY MASS INDEX: 24.73 KG/M2 | OXYGEN SATURATION: 94 % | HEIGHT: 61 IN | DIASTOLIC BLOOD PRESSURE: 64 MMHG | HEART RATE: 75 BPM

## 2025-03-25 DIAGNOSIS — J84.111 IDIOPATHIC INTERSTITIAL PNEUMONIA, NOT OTHERWISE SPECIFIED: ICD-10-CM

## 2025-03-25 DIAGNOSIS — J44.9 CHRONIC OBSTRUCTIVE PULMONARY DISEASE, UNSPECIFIED: ICD-10-CM

## 2025-03-25 PROCEDURE — 99214 OFFICE O/P EST MOD 30 MIN: CPT

## 2025-03-25 RX ORDER — AMLODIPINE BESYLATE 5 MG/1
TABLET ORAL
Refills: 0 | Status: ACTIVE | COMMUNITY

## 2025-08-20 ENCOUNTER — OFFICE (OUTPATIENT)
Facility: LOCATION | Age: OVER 89
Setting detail: OPHTHALMOLOGY
End: 2025-08-20
Payer: MEDICARE

## 2025-08-20 DIAGNOSIS — H26.40: ICD-10-CM

## 2025-08-20 DIAGNOSIS — H40.1111: ICD-10-CM

## 2025-08-20 DIAGNOSIS — H16.223: ICD-10-CM

## 2025-08-20 DIAGNOSIS — H35.033: ICD-10-CM

## 2025-08-20 DIAGNOSIS — H35.3132: ICD-10-CM

## 2025-08-20 DIAGNOSIS — Z96.1: ICD-10-CM

## 2025-08-20 DIAGNOSIS — H40.1122: ICD-10-CM

## 2025-08-20 PROCEDURE — 99213 OFFICE O/P EST LOW 20 MIN: CPT | Performed by: OPHTHALMOLOGY

## 2025-08-20 PROCEDURE — 92283 EXTND COLOR VISION XM: CPT | Performed by: OPHTHALMOLOGY

## 2025-08-20 PROCEDURE — 92250 FUNDUS PHOTOGRAPHY W/I&R: CPT | Performed by: OPHTHALMOLOGY

## 2025-08-20 PROCEDURE — 92083 EXTENDED VISUAL FIELD XM: CPT | Performed by: OPHTHALMOLOGY

## 2025-08-20 ASSESSMENT — KERATOMETRY
OS_K2POWER_DIOPTERS: 46.75
OS_AXISANGLE_DEGREES: 175
OS_K1POWER_DIOPTERS: 43.25

## 2025-08-20 ASSESSMENT — REFRACTION_CURRENTRX
OD_VPRISM_DIRECTION: PROGS
OS_SPHERE: -0.50
OS_ADD: +2.50
OD_SPHERE: -1.25
OS_OVR_VA: 20/
OS_CYLINDER: +2.75
OD_AXIS: 005
OD_ADD: +2.50
OD_CYLINDER: +1.50
OD_OVR_VA: 20/
OS_AXIS: 169
OS_VPRISM_DIRECTION: PROGS

## 2025-08-20 ASSESSMENT — REFRACTION_AUTOREFRACTION
OS_CYLINDER: +4.50
OD_CYLINDER: +2.50
OD_AXIS: 011
OS_SPHERE: -1.50
OD_SPHERE: -2.50
OS_AXIS: 177

## 2025-08-20 ASSESSMENT — SUPERFICIAL PUNCTATE KERATITIS (SPK)
OD_SPK: T
OS_SPK: T

## 2025-08-20 ASSESSMENT — VISUAL ACUITY
OS_BCVA: 20/30+1
OD_BCVA: 20/40

## 2025-08-20 ASSESSMENT — CONFRONTATIONAL VISUAL FIELD TEST (CVF)
OS_FINDINGS: FULL
OD_FINDINGS: FULL

## 2025-08-20 ASSESSMENT — TONOMETRY
OD_IOP_MMHG: 16
OS_IOP_MMHG: 17